# Patient Record
Sex: FEMALE | Race: OTHER | Employment: OTHER | URBAN - METROPOLITAN AREA
[De-identification: names, ages, dates, MRNs, and addresses within clinical notes are randomized per-mention and may not be internally consistent; named-entity substitution may affect disease eponyms.]

---

## 2018-05-15 ENCOUNTER — HOSPITAL ENCOUNTER (OUTPATIENT)
Dept: MRI IMAGING | Age: 78
Discharge: HOME | End: 2018-05-15
Attending: NEUROLOGICAL SURGERY
Payer: MEDICARE

## 2018-05-15 ENCOUNTER — HOSPITAL ENCOUNTER (OUTPATIENT)
Dept: LAB | Age: 78
Discharge: HOME | End: 2018-05-15
Payer: MEDICARE

## 2018-05-15 ENCOUNTER — HOSPITAL ENCOUNTER (OUTPATIENT)
Dept: PREADMISSION TESTING | Age: 78
Discharge: HOME | End: 2018-05-15
Payer: MEDICARE

## 2018-05-15 ENCOUNTER — HOSPITAL ENCOUNTER (OUTPATIENT)
Dept: GENERAL RADIOLOGY | Age: 78
Discharge: HOME | End: 2018-05-15
Attending: NEUROLOGICAL SURGERY
Payer: MEDICARE

## 2018-05-15 DIAGNOSIS — G95.9 CERVICAL MYELOPATHY WITH CERVICAL RADICULOPATHY (HCC): ICD-10-CM

## 2018-05-15 DIAGNOSIS — Z01.818 PRE-OP TESTING: ICD-10-CM

## 2018-05-15 DIAGNOSIS — M48.02 SPINAL STENOSIS IN CERVICAL REGION: ICD-10-CM

## 2018-05-15 DIAGNOSIS — M54.12 CERVICAL MYELOPATHY WITH CERVICAL RADICULOPATHY (HCC): ICD-10-CM

## 2018-05-15 DIAGNOSIS — M47.12 CERVICAL SPONDYLOSIS WITH MYELOPATHY: ICD-10-CM

## 2018-05-15 LAB
ALBUMIN SERPL-MCNC: 3.7 G/DL (ref 3.4–5)
ALBUMIN/GLOB SERPL: 1 {RATIO} (ref 0.8–1.7)
ALP SERPL-CCNC: 99 U/L (ref 45–117)
ALT SERPL-CCNC: 20 U/L (ref 13–56)
ANION GAP SERPL CALC-SCNC: 6 MMOL/L (ref 3–18)
APTT PPP: 34.7 SEC (ref 23–36.4)
AST SERPL-CCNC: 17 U/L (ref 15–37)
BASOPHILS # BLD: ABNORMAL K/UL
BASOPHILS NFR BLD: ABNORMAL %
BILIRUB SERPL-MCNC: 0.3 MG/DL (ref 0.2–1)
BUN SERPL-MCNC: 14 MG/DL (ref 7–18)
BUN/CREAT SERPL: 22 (ref 12–20)
CALCIUM SERPL-MCNC: 9.3 MG/DL (ref 8.5–10.1)
CHLORIDE SERPL-SCNC: 102 MMOL/L (ref 100–108)
CO2 SERPL-SCNC: 33 MMOL/L (ref 21–32)
CREAT SERPL-MCNC: 0.65 MG/DL (ref 0.6–1.3)
DIFFERENTIAL METHOD BLD: ABNORMAL
EOSINOPHIL # BLD: ABNORMAL K/UL
EOSINOPHIL NFR BLD: ABNORMAL %
ERYTHROCYTE [DISTWIDTH] IN BLOOD BY AUTOMATED COUNT: 13.6 % (ref 11.6–14.5)
GLOBULIN SER CALC-MCNC: 3.7 G/DL (ref 2–4)
GLUCOSE SERPL-MCNC: 129 MG/DL (ref 74–99)
HCT VFR BLD AUTO: 44.5 % (ref 35–45)
HGB BLD-MCNC: 14.1 G/DL (ref 12–16)
INR PPP: 1.1 (ref 0.8–1.2)
LYMPHOCYTES # BLD: ABNORMAL K/UL
LYMPHOCYTES NFR BLD: ABNORMAL %
MCH RBC QN AUTO: 29.8 PG (ref 24–34)
MCHC RBC AUTO-ENTMCNC: 31.7 G/DL (ref 31–37)
MCV RBC AUTO: 94.1 FL (ref 74–97)
MONOCYTES # BLD: ABNORMAL K/UL
MONOCYTES NFR BLD: ABNORMAL %
NEUTS SEG # BLD: ABNORMAL K/UL
NEUTS SEG NFR BLD: ABNORMAL %
PLATELET # BLD AUTO: 222 K/UL (ref 135–420)
PMV BLD AUTO: 12.2 FL (ref 9.2–11.8)
POTASSIUM SERPL-SCNC: 4.5 MMOL/L (ref 3.5–5.5)
PROT SERPL-MCNC: 7.4 G/DL (ref 6.4–8.2)
PROTHROMBIN TIME: 13.7 SEC (ref 11.5–15.2)
RBC # BLD AUTO: 4.73 M/UL (ref 4.2–5.3)
SODIUM SERPL-SCNC: 141 MMOL/L (ref 136–145)
WBC # BLD AUTO: 7.4 K/UL (ref 4.6–13.2)

## 2018-05-15 PROCEDURE — 71046 X-RAY EXAM CHEST 2 VIEWS: CPT

## 2018-05-15 PROCEDURE — 93005 ELECTROCARDIOGRAM TRACING: CPT

## 2018-05-15 PROCEDURE — 72040 X-RAY EXAM NECK SPINE 2-3 VW: CPT

## 2018-05-15 PROCEDURE — 80053 COMPREHEN METABOLIC PANEL: CPT | Performed by: NEUROLOGICAL SURGERY

## 2018-05-15 PROCEDURE — 72141 MRI NECK SPINE W/O DYE: CPT

## 2018-05-15 PROCEDURE — 36415 COLL VENOUS BLD VENIPUNCTURE: CPT | Performed by: NEUROLOGICAL SURGERY

## 2018-05-15 PROCEDURE — 85025 COMPLETE CBC W/AUTO DIFF WBC: CPT | Performed by: NEUROLOGICAL SURGERY

## 2018-05-15 PROCEDURE — 85610 PROTHROMBIN TIME: CPT | Performed by: NEUROLOGICAL SURGERY

## 2018-05-15 PROCEDURE — 85730 THROMBOPLASTIN TIME PARTIAL: CPT | Performed by: NEUROLOGICAL SURGERY

## 2018-05-15 PROCEDURE — 72050 X-RAY EXAM NECK SPINE 4/5VWS: CPT

## 2018-05-16 LAB
ATRIAL RATE: 85 BPM
CALCULATED P AXIS, ECG09: 79 DEGREES
CALCULATED R AXIS, ECG10: 20 DEGREES
CALCULATED T AXIS, ECG11: 61 DEGREES
DIAGNOSIS, 93000: NORMAL
P-R INTERVAL, ECG05: 190 MS
Q-T INTERVAL, ECG07: 378 MS
QRS DURATION, ECG06: 96 MS
QTC CALCULATION (BEZET), ECG08: 449 MS
VENTRICULAR RATE, ECG03: 85 BPM

## 2018-05-25 PROBLEM — M48.02 CERVICAL STENOSIS OF SPINAL CANAL: Status: ACTIVE | Noted: 2018-05-25

## 2018-06-07 ENCOUNTER — OFFICE VISIT (OUTPATIENT)
Dept: FAMILY MEDICINE CLINIC | Facility: CLINIC | Age: 78
End: 2018-06-07

## 2018-06-07 VITALS
HEIGHT: 65 IN | RESPIRATION RATE: 20 BRPM | BODY MASS INDEX: 22.16 KG/M2 | DIASTOLIC BLOOD PRESSURE: 86 MMHG | TEMPERATURE: 98.8 F | HEART RATE: 86 BPM | OXYGEN SATURATION: 97 % | WEIGHT: 133 LBS | SYSTOLIC BLOOD PRESSURE: 113 MMHG

## 2018-06-07 DIAGNOSIS — N39.41 URGE INCONTINENCE OF URINE: Primary | ICD-10-CM

## 2018-06-07 DIAGNOSIS — Z23 ENCOUNTER FOR IMMUNIZATION: ICD-10-CM

## 2018-06-07 DIAGNOSIS — E55.9 VITAMIN D DEFICIENCY: ICD-10-CM

## 2018-06-07 DIAGNOSIS — Z13.31 SCREENING FOR DEPRESSION: ICD-10-CM

## 2018-06-07 DIAGNOSIS — R73.9 ELEVATED BLOOD SUGAR: ICD-10-CM

## 2018-06-07 DIAGNOSIS — M48.02 CERVICAL STENOSIS OF SPINAL CANAL: ICD-10-CM

## 2018-06-07 DIAGNOSIS — Z13.29 SCREENING FOR THYROID DISORDER: ICD-10-CM

## 2018-06-07 DIAGNOSIS — Z13.220 SCREENING FOR LIPID DISORDERS: ICD-10-CM

## 2018-06-07 LAB
GLUCOSE POC: 84 MG/DL
HBA1C MFR BLD HPLC: 6.4 %

## 2018-06-07 RX ORDER — OXYBUTYNIN CHLORIDE 5 MG/1
5 TABLET, EXTENDED RELEASE ORAL DAILY
Qty: 90 TAB | Refills: 3 | Status: SHIPPED | OUTPATIENT
Start: 2018-06-07 | End: 2022-08-11

## 2018-06-07 NOTE — PROGRESS NOTES
Damon Vail is 78 y.o. female presents today for office visit to establish care. Pt status post cervical laminectomy on 5/25/18. Pt C/O urine urgency x 1 month. Pt is not fasting. Pt is in Room# 2. Have you been to the ER, urgent care clinic since your last visit? Hospitalized since your last visit? Have you seen or consulted any other health care providers outside of the [address] [place] [address] [place] since your last visit? Include any pap smears or colon screening. Yes. Dr. Kailee Dow for cervical laminectomy surgery 5/25/18. Health Maintenance reviewed and pt stated that she had a bone density screening last year. Upcoming Appts  Dr. Kailee Dow 6/7/18    Requested Prescriptions      No prescriptions requested or ordered in this encounter       There were no vitals taken for this visit. PHQ over the last two weeks 6/7/2018   Little interest or pleasure in doing things Not at all   Feeling down, depressed or hopeless Not at all   Total Score PHQ 2 0     ADL Assessment 6/7/2018   Feeding yourself No Help Needed   Getting from bed to chair No Help Needed   Getting dressed No Help Needed   Bathing or showering No Help Needed   Walk across the room (includes cane/walker) No Help Needed   Using the telphone No Help Needed   Taking your medications No Help Needed   Preparing meals No Help Needed   Managing money (expenses/bills) No Help Needed   Moderately strenuous housework (laundry) No Help Needed   Shopping for personal items (toiletries/medicines) No Help Needed   Shopping for groceries No Help Needed   Driving No Help Needed   Climbing a flight of stairs No Help Needed   Getting to places beyond walking distances No Help Needed     Fall Risk Assessment, last 12 mths 6/7/2018   Able to walk? Yes   Fall in past 12 months?  No

## 2018-06-07 NOTE — PATIENT INSTRUCTIONS
Bladder Training: Care Instructions  Your Care Instructions    Bladder training is used to treat urge incontinence and stress incontinence. Urge incontinence means that the need to urinate comes on so fast that you can't get to a toilet in time. Stress incontinence means that you leak urine because of pressure on your bladder. For example, it may happen when you laugh, cough, or lift something heavy. Bladder training can increase how long you can wait before you have to urinate. It can also help your bladder hold more urine. And it can give you better control over the urge to urinate. It is important to remember that bladder training takes a few weeks to a few months to make a difference. You may not see results right away, but don't give up. Follow-up care is a key part of your treatment and safety. Be sure to make and go to all appointments, and call your doctor if you are having problems. It's also a good idea to know your test results and keep a list of the medicines you take. How can you care for yourself at home? Work with your doctor to come up with a bladder training program that is right for you. You may use one or more of the following methods. Delayed urination  · In the beginning, try to keep from urinating for 5 minutes after you first feel the need to go. · While you wait, take deep, slow breaths to relax. Kegel exercises can also help you delay the need to go to the bathroom. · After some practice, when you can easily wait 5 minutes to urinate, try to wait 10 minutes before you urinate. · Slowly increase the waiting period until you are able to control when you have to urinate. Scheduled urination  · Empty your bladder when you first wake up in the morning. · Schedule times throughout the day when you will urinate. · Start by going to the bathroom every hour, even if you don't need to go. · Slowly increase the time between trips to the bathroom.   · When you have found a schedule that works well for you, keep doing it. · If you wake up during the night and have to urinate, do it. Apply your schedule to waking hours only. Kegel exercises  These tighten and strengthen pelvic muscles, which can help you control the flow of urine. To do Kegel exercises:  · Squeeze the same muscles you would use to stop your urine. Your belly and thighs should not move. · Hold the squeeze for 3 seconds, and then relax for 3 seconds. · Start with 3 seconds. Then add 1 second each week until you are able to squeeze for 10 seconds. · Repeat the exercise 10 to 15 times a session. Do three or more sessions a day. When should you call for help? Watch closely for changes in your health, and be sure to contact your doctor if:  ? · Your incontinence is getting worse. ? · You do not get better as expected. Where can you learn more? Go to http://sebas-paula.info/. Enter R164 in the search box to learn more about \"Bladder Training: Care Instructions. \"  Current as of: May 12, 2017  Content Version: 11.4  © 7363-1401 [employer]. Care instructions adapted under license by [employer] (which disclaims liability or warranty for this information). If you have questions about a medical condition or this instruction, always ask your healthcare professional. [address] any warranty or liability for your use of this information. Medicare Wellness Visit, Female    The best way to live healthy is to have a lifestyle where you eat a well-balanced diet, exercise regularly, limit alcohol use, and quit all forms of tobacco/nicotine, if applicable. Regular preventive services are another way to keep healthy. Preventive services (vaccines, screening tests, monitoring & exams) can help personalize your care plan, which helps you manage your own care. Screening tests can find health problems at the earliest stages, when they are easiest to treat.      [facility] [facility] follows the current, evidence-based guidelines published by the [facility] ([facility]) when recommending preventive services for our patients. Because we follow these guidelines, sometimes recommendations change over time as research supports it. (For example, mammograms used to be recommended annually. Even though Medicare will still pay for an annual mammogram, the newer guidelines recommend a mammogram every two years for women of average risk.)    Of course, you and your provider may decide to screen more often for some diseases, based on your risk and co-morbidities (chronic disease you are already diagnosed with). Preventive services for you include:    - Medicare offers their members a free annual wellness visit, which is time for you and your primary care provider to discuss and plan for your preventive service needs. Take advantage of this benefit every year!    -All people over age 72 should receive the recommended pneumonia vaccines. Current USPSTF guidelines recommend a series of two vaccines for the best pneumonia protection.     -All adults should have a yearly flu vaccine and a tetanus vaccine every 10 years. All adults age 61 years should receive a shingles vaccine once in their lifetime.      -A bone mass density test is recommended when a woman turns 65 to screen for osteoporosis. This test is only recommended once as a screening. Some providers will use this same test as a disease monitoring tool if you already have osteoporosis.     -All adults age 38-68 years who are overweight should have a diabetes screening test once every three years.     -Other screening tests & preventive services for persons with diabetes include: an eye exam to screen for diabetic retinopathy, a kidney function test, a foot exam, and stricter control over your cholesterol.     -Cardiovascular screening for adults with routine risk involves an electrocardiogram (ECG) at intervals determined by the provider.     -Colorectal cancer screenings should be done for adults age 54-65 years with normal risk. There are a number of acceptable methods of screening for this type of cancer. Each test has its own benefits and drawbacks. Discuss with your provider what is most appropriate for you during your annual wellness visit. The different tests include: colonoscopy (considered the best screening method), a fecal occult blood test, a fecal DNA test, and sigmoidoscopy. -Breast cancer screenings are recommended every other year for women of normal risk age 54-69 years.     -Cervical cancer screenings for women over age 72 are only recommended with certain risk factors.     -All adults born between [facility] should be screened once for Hepatitis C.      Here is a list of your current Health Maintenance items (your personalized list of preventive services) with a due date:  Health Maintenance Due   Topic Date Due    DTaP/Tdap/Td  (1 - Tdap) 08/11/1963    Shingles Vaccine  06/11/2002    Glaucoma Screening   08/11/2007    Bone Mineral Density   08/11/2007    Pneumococcal Vaccine (1 of 2 - PCV13) 08/11/2007    Annual Well Visit  06/01/2018

## 2018-06-07 NOTE — PROGRESS NOTES
Meagan Owens is 78 y.o. female presents today for office visit to establish care. Pt is *** fasting. Pt is in Room# 2.     1. Have you been to the ER, urgent care clinic since your last visit? Hospitalized since your last visit? {Yes when where and reason for visit:20441}    2. Have you seen or consulted any other health care providers outside of the [address] [place] [address] since your last visit? Include any pap smears or colon screening. {Yes when where and reason for visit:20441}    Health Maintenance reviewed - {health maintenance:[id number]}. Upcoming Appts  ***    Requested Prescriptions      No prescriptions requested or ordered in this encounter       There were no vitals taken for this visit.

## 2018-06-07 NOTE — PROGRESS NOTES
History and Physical    Today's Date:  2018   Patient's Name: Alessandra Bolanos   Patient's :  1940     History:     Chief Complaint   Patient presents with   [facility]    Urgency     Alessandra Bolanos is a 78 y.o. female presenting for initial visit to [facility]. Will obtain records from previous provider to review. Care team updated on [facility]. Urinary urgency  This is a chronic problem, new to me This is not at goal. Pt denies dysuria. UA in the hospital showed small blood and high glucose. Back pain  This is a chronic problem, new to me. This is controlled. Pt takes flexeril and percoet. These are effective. Elevated blood sugar  This is a new problem. This is not controlled. Labs were last done on 2018. Pt takes no medication for this. Past Medical History:   Diagnosis Date    Arthritis     Cataract     Herniated cervical disc     Urge incontinence of urine 2018    Urinary incontinence      Past Surgical History:   Procedure Laterality Date    HX ORTHOPAEDIC      cervical laminectomy 18    HX TUBAL LIGATION        reports that she quit smoking about 7 years ago. She has a 23.00 pack-year smoking history. She has never used smokeless tobacco. She reports that she drinks alcohol. She reports that she does not use illicit drugs. Family History   Problem Relation Age of Onset    Diabetes Sister     Diabetes Brother     Diabetes Maternal Grandmother     Cancer Maternal Grandmother     Heart Disease Maternal Grandfather      No Known Allergies  Problem List:      Patient Active Problem List   Diagnosis Code    Cervical stenosis of spinal canal M48.02    Urge incontinence of urine N39.41     Medications:     Current Outpatient Prescriptions   Medication Sig    oxybutynin chloride XL (DITROPAN XL) 5 mg CR tablet Take 1 Tab by mouth daily.     cyclobenzaprine (FLEXERIL) 10 mg tablet Take 1 Tab by mouth three (3) times daily as needed for Muscle Spasm(s).  oxyCODONE-acetaminophen (PERCOCET) 5-325 mg per tablet Take 1 Tab by mouth every four (4) hours as needed. Max Daily Amount: 6 Tabs.  calcium-cholecalciferol, D3, (CALTRATE 600+D) tablet Take 1 Tab by mouth daily.  ascorbic acid, vitamin C, (VITAMIN C) 500 mg tablet Take 1,000 mg by mouth daily. No current facility-administered medications for this visit.       Review of Systems:   (Positives in bold)   General:   fevers, chills, generalized weakness, fatigue, weight change, night sweats, appetite change   Neurologic: dizziness, lightheadedness, headaches, loss of consciousness, numbness, tingling (both feet and hands), focal weakness  Eyes:  vision changes, double vision, photophobia  Ears:  change in hearing, ear pain, ear discharge, ear ringing  Nose:  sneezing, runny nose, nasal congestion  Mouth/Throat: sore throat, voice change, dry mouth, difficulty swallowing  Neck:  pain, stiffness, swelling  Respiratory: dyspnea at rest, dyspnea on exertion, wheezing, cough, sputum production  Cardiovascular:   chest pain, palpitations, pedal edema, leg cramps  Breasts: lumps, discharge, pain, rash, skin changes, changes on self-exam  Gastrointestinal:  nausea, vomiting, abdominal pain, constipation, diarrhea, heart burn, bloody stools, tarry black stools, rectal pain, hemorrhoids  Urinary: dysuria, urinary frequency, nocturia, malodorous urine  Genital (F): vaginal discharge, ulcerations, rashes, change in menses (post menopausal), pelvic pain  Musculoskeletal:  joint pain, joint stiffness, joint swelling, back pain, focal muscle pain, diffuse myalgias  Psychiatric: insomnia, anxiety, depression, hallucinations, suicidal ideation, homicidal ideation  Endocrine: polydipsia, polyuria, polyphagia, cold intolerance, heat intolerance  Hematologic: easy bruising, easy bleeding  Dermatologic: Itching, rash    Physical Assessment:   VS:    Visit Vitals    /86 (BP 1 Location: Left arm, BP Patient Position: Sitting)    Pulse 86    Temp 98.8 °F (37.1 °C) (Oral)    Resp 20    Ht 5' 5\" (1.651 m)    Wt 133 lb (60.3 kg)    SpO2 97%    BMI 22.13 kg/m2     General:   Well-groomed, well-nourished, in no distress, pleasant, alert, appropriate and conversant. Eyes:    PERRL, EOMI  Ears:  TMs normal, no ear wax  Mouth:  MMM, good dentition, oropharynx WNL without membranes, exudates, petechiae or ulcers  Neck:   Neck supple, no swelling, mass or tenderness  Cardiovascular:   No JVD. RRR, no MRG. Pulmonary:   Lungs clear bilaterally. Normal respiratory effort. Abdomen:   Abdomen soft, NT, ND, NAB  Extremities:   No edema, LEs warm and well-perfused. Neuro:   Alert and oriented, no focal deficits. No facial asymmetry noted.   Skin:    No rash or jaundice  MSK:   Normal ROM, 5/5 muscle strength  Psych:  No pressured speech or abnormal thought content    PHQ over the last two weeks 6/7/2018   Little interest or pleasure in doing things Not at all   Feeling down, depressed or hopeless Not at all   Total Score PHQ 2 0     Office Visit on 06/07/2018   Component Date Value Ref Range Status    Hemoglobin A1c (POC) 06/07/2018 6.4  % Final    Glucose POC 06/07/2018 84  mg/dL Final   Admission on 05/25/2018, Discharged on 05/27/2018   Component Date Value Ref Range Status    Glucose (POC) 05/25/2018 125* 65 - 105 mg/dL Final    Comment: Result Not Confirmed  : [id number]      Color 05/26/2018 YELLOW  YELLOW,STRAW   Final    Appearance 05/26/2018 CLEAR  CLEAR   Final    Glucose 05/26/2018 250* NEGATIVE,Negative mg/dl Final    Bilirubin 05/26/2018 NEGATIVE  NEGATIVE,Negative   Final    Ketone 05/26/2018 NEGATIVE  NEGATIVE,Negative mg/dl Final    Specific gravity 05/26/2018 1.010  1.005 - 1.030   Final    Blood 05/26/2018 SMALL* NEGATIVE,Negative   Final    pH (UA) 05/26/2018 6.5  5.0 - 9.0   Final    Protein 05/26/2018 NEGATIVE  NEGATIVE,Negative mg/dl Final    Urobilinogen 05/26/2018 0.2  0.0 - 1.0 mg/dl Final    Nitrites 05/26/2018 NEGATIVE  NEGATIVE,Negative   Final    Leukocyte Esterase 05/26/2018 NEGATIVE  NEGATIVE,Negative   Final    Epithelial cells, squamous 05/26/2018 OCCASIONAL  /LPF Final    WBC 05/26/2018 OCCASIONAL  /HPF Final    RBC 05/26/2018 5-9  /HPF Final    Bacteria 05/26/2018 OCCASIONAL  /HPF Final   Hospital Outpatient Visit on 05/15/2018   Component Date Value Ref Range Status    Ventricular Rate 05/15/2018 85  BPM Final    Atrial Rate 05/15/2018 85  BPM Final    P-R Interval 05/15/2018 190  ms Final    QRS Duration 05/15/2018 96  ms Final    Q-T Interval 05/15/2018 378  ms Final    QTC Calculation (Bezet) 05/15/2018 449  ms Final    Calculated P Axis 05/15/2018 79  degrees Final    Calculated R Axis 05/15/2018 20  degrees Final    Calculated T Axis 05/15/2018 61  degrees Final    Diagnosis 05/15/2018    Final                    Value:Normal sinus rhythm  Possible Left atrial enlargement  Borderline ECG  No previous ECGs available  Confirmed by Luis Marley (2018) on 5/16/2018 3:03:15 PM     Hospital Outpatient Visit on 05/15/2018   Component Date Value Ref Range Status    WBC 05/15/2018 7.4  4.6 - 13.2 K/uL Final    RBC 05/15/2018 4.73  4.20 - 5.30 M/uL Final    HGB 05/15/2018 14.1  12.0 - 16.0 g/dL Final    HCT 05/15/2018 44.5  35.0 - 45.0 % Final    MCV 05/15/2018 94.1  74.0 - 97.0 FL Final    MCH 05/15/2018 29.8  24.0 - 34.0 PG Final    MCHC 05/15/2018 31.7  31.0 - 37.0 g/dL Final    RDW 05/15/2018 13.6  11.6 - 14.5 % Final    PLATELET 55/65/3575 004  135 - 420 K/uL Final    MPV 05/15/2018 12.2* 9.2 - 11.8 FL Final    NEUTROPHILS 05/15/2018 OE  % Final    LYMPHOCYTES 05/15/2018 OE  % Final    MONOCYTES 05/15/2018 OE  % Final    EOSINOPHILS 05/15/2018 OE  % Final    BASOPHILS 05/15/2018 OE  % Final    ABS. NEUTROPHILS 05/15/2018 OE  K/UL Final    ABS. LYMPHOCYTES 05/15/2018 OE  K/UL Final    ABS. MONOCYTES 05/15/2018 OE  K/UL Final    ABS.  EOSINOPHILS 05/15/2018 OE  K/UL Final    ABS. BASOPHILS 05/15/2018 OE  K/UL Final    DF 05/15/2018 OE   Final    Sodium 05/15/2018 141  136 - 145 mmol/L Final    Potassium 05/15/2018 4.5  3.5 - 5.5 mmol/L Final    Chloride 05/15/2018 102  100 - 108 mmol/L Final    CO2 05/15/2018 33* 21 - 32 mmol/L Final    Anion gap 05/15/2018 6  3.0 - 18 mmol/L Final    Glucose 05/15/2018 129* 74 - 99 mg/dL Final    BUN 05/15/2018 14  7.0 - 18 MG/DL Final    Creatinine 05/15/2018 0.65  0.6 - 1.3 MG/DL Final    BUN/Creatinine ratio 05/15/2018 22* 12 - 20   Final    GFR est AA 05/15/2018 >60  >60 ml/min/1.73m2 Final    GFR est non-AA 05/15/2018 >60  >60 ml/min/1.73m2 Final    Comment: (NOTE)  Estimated GFR is calculated using the Modification of Diet in Renal   Disease (MDRD) Study equation, reported for both  Americans   (GFRAA) and non- Americans (GFRNA), and normalized to 1.73m2   body surface area. The physician must decide which value applies to   the patient. The MDRD study equation should only be used in   individuals age 25 or older. It has not been validated for the   following: pregnant women, patients with serious comorbid conditions,   or on certain medications, or persons with extremes of body size,   muscle mass, or nutritional status.  Calcium 05/15/2018 9.3  8.5 - 10.1 MG/DL Final    Bilirubin, total 05/15/2018 0.3  0.2 - 1.0 MG/DL Final    ALT (SGPT) 05/15/2018 20  13 - 56 U/L Final    AST (SGOT) 05/15/2018 17  15 - 37 U/L Final    Alk.  phosphatase 05/15/2018 99  45 - 117 U/L Final    Protein, total 05/15/2018 7.4  6.4 - 8.2 g/dL Final    Albumin 05/15/2018 3.7  3.4 - 5.0 g/dL Final    Globulin 05/15/2018 3.7  2.0 - 4.0 g/dL Final    A-G Ratio 05/15/2018 1.0  0.8 - 1.7   Final    Prothrombin time 05/15/2018 13.7  11.5 - 15.2 sec Final    INR 05/15/2018 1.1  0.8 - 1.2   Final    Comment:            INR Therapeutic Ranges         (on stable oral anticoagulant):     INDICATION INR  DVT/PE/Atrial Fib          2.0-3.0  MI/Mechanical Heart Valve  2.5-3.5      aPTT 05/15/2018 34.7  23.0 - 36.4 SEC Final     Assessment/Plan & Orders:         ICD-10-CM ICD-9-CM    1. Urge incontinence of urine N39.41 788.31 oxybutynin chloride XL (DITROPAN XL) 5 mg CR tablet   2. Cervical stenosis of spinal canal M48.02 723.0    3. Elevated blood sugar R73.9 790.29 AMB POC HEMOGLOBIN A1C      AMB POC GLUCOSE, QUANTITATIVE, BLOOD      METABOLIC PANEL, COMPREHENSIVE   4. Vitamin D deficiency E55.9 268.9 VITAMIN D, 25 HYDROXY   5. Encounter for immunization Z23 V03.89 diphtheria-pertussis, acellular,-tetanus (BOOSTRIX TDAP) 2.5-8-5 Lf-mcg-Lf/0.5mL susp susp      pneumococcal 23-valent (PNEUMOVAX 23) 25 mcg/0.5 mL injection   6. Screening for thyroid disorder Z13.29 V77.0 TSH 3RD GENERATION      T4, FREE   7. Screening for lipid disorders Z13.220 V77.91 LIPID PANEL   8. Screening for depression Z13.89 V79.0 DEPRESSION SCREEN ANNUAL       Colon cancer: colonoscopy done 2 yrs ago  Dyslipidemia: will check FLP  Diabetes mellitus: will check A1c  Influenza vaccine: due in the fall  Pneumococcal vaccine: unknown  Tdap: unknown  Herpes Zoster vaccine: due at age 61  Hep B vaccine: not indicated (liver dz, DM 19-59)  Weight:  Body mass index is 22.13 kg/(m^2). Discussed the patient's BMI with her. The BMI follow up plan is as follows: Improve diet and 30 min of moderate activity at least 5 times a week  Cervical cancer:  pap smear unknown  Breast Cancer: mammogram due  Osteoporosis: will check Vit D. No indication for DEXA scan    Healthy lifestyle has been encouraged including avoidance of tobacco, limiting or avoiding alcohol intake, heart healthy diet which is low in cholesterol and saturated fat and contains fresh fruits, vegetables and whole grains and fiber, regular exercise with goals of 20-30 minutes 3-5 days weekly and maintaining an optimal BMI.    Depression screenin18    Follow-up Disposition:  Return in about 4 weeks (around 7/5/2018) for Go over lab/imaging results, Medicare wellness physical, 30 minutes. *Patient verbalized understanding and agreement with the plan. Patient was given an after-visit summary. Vera Rhoades.  Art1 RAUL John MD - Internal Medicine  6/7/2018, [facility]  [address] [place], [address]  Phone [phone]  Fax [phone]

## 2018-06-07 NOTE — MR AVS SNAPSHOT
[address] [place] [address] [id number] 
[phone] Patient: Gudelia Castellanos MRN: [id number] :1940 Visit Information Date & Time Provider Department Dept. Phone Encounter #  
 2018  7:30 AM Anny Degroot MD [facility] [phone] [id number] Follow-up Instructions Return in about 4 weeks (around 2018) for Go over lab/imaging results. Upcoming Health Maintenance Date Due DTaP/Tdap/Td series (1 - Tdap) 1961 ZOSTER VACCINE AGE 60> 2002 GLAUCOMA SCREENING Q2Y 2007 Bone Densitometry (Dexa) Screening 2007 Pneumococcal 65+ Low/Medium Risk (1 of 2 - PCV13) 2007 MEDICARE YEARLY EXAM 2018 Influenza Age 5 to Adult 2018 Allergies as of 2018  Review Complete On: 2018 By: Anny Degroot MD  
 No Known Allergies Current Immunizations  Never Reviewed No immunizations on file. Not reviewed this visit You Were Diagnosed With   
  
 Codes Comments Urinary urgency    -  Primary ICD-10-CM: R39.15 ICD-9-CM: 666.72 Elevated blood sugar     ICD-10-CM: R73.9 ICD-9-CM: 790.29 Cervical stenosis of spinal canal     ICD-10-CM: M48.02 
ICD-9-CM: 723.0 Vitamin D deficiency     ICD-10-CM: E55.9 ICD-9-CM: 268.9 Encounter for immunization     ICD-10-CM: W12 ICD-9-CM: V03.89 Screening for thyroid disorder     ICD-10-CM: Z13.29 ICD-9-CM: V77.0 Screening for lipid disorders     ICD-10-CM: Z13.220 ICD-9-CM: V77.91 Vitals BP Pulse Temp Resp Height(growth percentile) Weight(growth percentile) 113/86 (BP 1 Location: Left arm, BP Patient Position: Sitting) 86 98.8 °F (37.1 °C) (Oral) 20 5' 5\" (1.651 m) 133 lb (60.3 kg) SpO2 BMI OB Status Smoking Status 97% 22.13 kg/m2 Postmenopausal Former Smoker BMI and BSA Data  Body Mass Index Body Surface Area  
 22.13 kg/m 2 1.66 m 2  
  
  
 Preferred Pharmacy Pharmacy Name Phone [facility][id number]- [id number], [address] [place] [address] [phone] Your Updated Medication List  
  
   
This list is accurate as of 18  8:27 AM.  Always use your most recent med list.  
  
  
  
  
 calcium-cholecalciferol (D3) tablet Commonly known as:  CALTRATE 600+D Take 1 Tab by mouth daily. cyclobenzaprine 10 mg tablet Commonly known as:  FLEXERIL Take 1 Tab by mouth three (3) times daily as needed for Muscle Spasm(s). diphtheria-pertussis (acellular)-tetanus 2.5-8-5 Lf-mcg-Lf/0.5mL Susp susp Commonly known as:  BOOSTRIX TDAP  
0.5 mL by IntraMUSCular route once for 1 dose. oxybutynin chloride XL 5 mg CR tablet Commonly known as:  DITROPAN XL Take 1 Tab by mouth daily. oxyCODONE-acetaminophen 5-325 mg per tablet Commonly known as:  PERCOCET Take 1 Tab by mouth every four (4) hours as needed. Max Daily Amount: 6 Tabs. pneumococcal 23-valent 25 mcg/0.5 mL injection Commonly known as:  PNEUMOVAX 23  
0.5 mL by IntraMUSCular route once for 1 dose. VITAMIN C 500 mg tablet Generic drug:  ascorbic acid (vitamin C) Take 1,000 mg by mouth daily. Prescriptions Printed Refills diphtheria-pertussis, acellular,-tetanus (BOOSTRIX TDAP) 2.5-8-5 Lf-mcg-Lf/0.5mL susp susp 0 Si.5 mL by IntraMUSCular route once for 1 dose. Class: Print Route: IntraMUSCular  
 pneumococcal 23-valent (PNEUMOVAX 23) 25 mcg/0.5 mL injection 0 Si.5 mL by IntraMUSCular route once for 1 dose. Class: Print Route: IntraMUSCular Prescriptions Sent to Pharmacy Refills  
 oxybutynin chloride XL (DITROPAN XL) 5 mg CR tablet 3 Sig: Take 1 Tab by mouth daily. Class: Normal  
 Pharmacy: [address] [place] [address], [address] [place] [address]  #: [phone] Route: Oral  
  
We Performed the Following AMB POC GLUCOSE, QUANTITATIVE, BLOOD [65864 CPT(R)] AMB POC HEMOGLOBIN A1C [63442 CPT(R)] Follow-up Instructions Return in about 4 weeks (around 7/5/2018) for Go over lab/imaging results. To-Do List   
 06/07/2018 Lab:  LIPID PANEL   
  
 06/07/2018 Lab:  METABOLIC PANEL, COMPREHENSIVE   
  
 06/07/2018 Lab:  T4, FREE   
  
 06/07/2018 Lab:  TSH 3RD GENERATION   
  
 06/07/2018 Lab:  VITAMIN D, 25 HYDROXY Introducing [address]! Dear Angeline Valencia: Thank you for requesting a [employer] account. Our records indicate that you already have an active [employer] account. You can access your account anytime at [email or web address] Did you know that you can access your hospital and ER discharge instructions at any time in [employer]? You can also review all of your test results from your hospital stay or ER visit. Additional Information If you have questions, please visit the Frequently Asked Questions section of the [employer] website at [email or web address] Remember, [employer] is NOT to be used for urgent needs. For medical emergencies, dial 911. Now available from your iPhone and Android! Please provide this summary of care documentation to your next provider. Your primary care clinician is listed as Jarett Christensen. If you have any questions after today's visit, please call [phone].

## 2018-06-19 ENCOUNTER — HOSPITAL ENCOUNTER (OUTPATIENT)
Dept: GENERAL RADIOLOGY | Age: 78
Discharge: HOME | End: 2018-06-19
Attending: NEUROLOGICAL SURGERY
Payer: MEDICARE

## 2018-06-19 DIAGNOSIS — Z09 FOLLOW-UP EXAMINATION FOLLOWING SURGERY: ICD-10-CM

## 2018-06-19 PROCEDURE — 72040 X-RAY EXAM NECK SPINE 2-3 VW: CPT

## 2018-06-27 ENCOUNTER — TELEPHONE (OUTPATIENT)
Dept: FAMILY MEDICINE CLINIC | Facility: CLINIC | Age: 78
End: 2018-06-27

## 2018-08-23 ENCOUNTER — TELEPHONE (OUTPATIENT)
Dept: FAMILY MEDICINE CLINIC | Facility: CLINIC | Age: 78
End: 2018-08-23

## 2018-08-23 NOTE — TELEPHONE ENCOUNTER
Called pt and unable to leave message. The call was to inform pt of MWV, BW is overdue and a follow up appt is encouraged. [employer] message sent.

## 2018-08-30 ENCOUNTER — DOCUMENTATION ONLY (OUTPATIENT)
Dept: FAMILY MEDICINE CLINIC | Facility: CLINIC | Age: 78
End: 2018-08-30

## 2018-08-30 NOTE — PROGRESS NOTES
Received [employer] message pt has moved and will no longer be followed by this practice. Update to gorge/CC.

## 2022-03-19 PROBLEM — M48.02 CERVICAL STENOSIS OF SPINAL CANAL: Status: ACTIVE | Noted: 2018-05-25

## 2022-03-19 PROBLEM — N39.41 URGE INCONTINENCE OF URINE: Status: ACTIVE | Noted: 2018-06-07

## 2022-08-12 PROBLEM — M48.061 LUMBAR STENOSIS: Status: ACTIVE | Noted: 2022-08-12

## 2023-01-31 RX ORDER — ASCORBIC ACID 500 MG
1000 TABLET ORAL DAILY
COMMUNITY

## 2023-01-31 RX ORDER — CYCLOBENZAPRINE HCL 10 MG
10 TABLET ORAL 3 TIMES DAILY PRN
COMMUNITY
Start: 2022-08-15

## 2023-01-31 RX ORDER — METHYLPREDNISOLONE 4 MG/1
TABLET ORAL
COMMUNITY
Start: 2022-08-15

## 2023-01-31 RX ORDER — LOSARTAN POTASSIUM 100 MG/1
100 TABLET ORAL DAILY
COMMUNITY

## 2023-01-31 RX ORDER — ALENDRONATE SODIUM 5 MG
5 TABLET ORAL
COMMUNITY